# Patient Record
Sex: FEMALE | Race: WHITE | NOT HISPANIC OR LATINO | URBAN - METROPOLITAN AREA
[De-identification: names, ages, dates, MRNs, and addresses within clinical notes are randomized per-mention and may not be internally consistent; named-entity substitution may affect disease eponyms.]

---

## 2017-08-16 ENCOUNTER — APPOINTMENT (RX ONLY)
Dept: URBAN - METROPOLITAN AREA CLINIC 17 | Facility: CLINIC | Age: 37
Setting detail: DERMATOLOGY
End: 2017-08-16

## 2017-08-16 DIAGNOSIS — L90.0 LICHEN SCLEROSUS ET ATROPHICUS: ICD-10-CM

## 2017-08-16 PROCEDURE — 99202 OFFICE O/P NEW SF 15 MIN: CPT

## 2017-08-16 PROCEDURE — ? TREATMENT REGIMEN

## 2017-08-16 PROCEDURE — ? PRESCRIPTION

## 2017-08-16 PROCEDURE — ? COUNSELING

## 2017-08-16 PROCEDURE — ? OTHER

## 2017-08-16 RX ORDER — TACROLIMUS 1 MG/G
1 OINTMENT TOPICAL BID
Qty: 1 | Refills: 3 | Status: ERX | COMMUNITY
Start: 2017-08-16

## 2017-08-16 RX ADMIN — TACROLIMUS 1: 1 OINTMENT TOPICAL at 00:00

## 2017-08-16 ASSESSMENT — LOCATION ZONE DERM: LOCATION ZONE: VULVA

## 2017-08-16 ASSESSMENT — LOCATION DETAILED DESCRIPTION DERM: LOCATION DETAILED: RIGHT LABIA MINORA

## 2017-08-16 ASSESSMENT — LOCATION SIMPLE DESCRIPTION DERM: LOCATION SIMPLE: VULVA

## 2017-08-16 NOTE — PROCEDURE: TREATMENT REGIMEN
Detail Level: Generalized
Plan: Okay with her continuing the Premarin cream to treat any concurrent atrophic vaginitis
Continue Regimen: Clobetasol ointment (alternate with Protopic)
Initiate Treatment: Protopic 0.1% topical ointment BID (alternate with Clobetasol)

## 2017-08-16 NOTE — PROCEDURE: OTHER
Note Text (......Xxx Chief Complaint.): This diagnosis correlates with the
Other (Free Text): Discussed using medication daily for three months then slowly decreasing down to 2 days a week as tolerated then off completely if there are no flares for two months
Detail Level: Zone

## 2017-08-16 NOTE — HPI: RASH
Additional History: Patient has had rash on gentian area for the last 6-8 months. It started with strep infection and has been present ever since. Patient went to see PCP in [place] and was diagnosed with atrophic vaginitis and was prescribed Premarin cream to apply to area twice daily and internally. Moved to [facility] and saw an OB/GYN and was diagnosed with lichen sclerosis and was prescribed Clobetasol ointment for one month and took one week off. Patient stopped Clobetasol ointment due to palpations. Patient has been applying it once daily. When its severe she applies it twice daily. OB advised patient to stop applying Premarin internally. 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  Patient is here today for further evaluation.

## 2017-11-15 ENCOUNTER — APPOINTMENT (RX ONLY)
Dept: URBAN - METROPOLITAN AREA CLINIC 17 | Facility: CLINIC | Age: 37
Setting detail: DERMATOLOGY
End: 2017-11-15

## 2017-11-15 DIAGNOSIS — L90.0 LICHEN SCLEROSUS ET ATROPHICUS: ICD-10-CM | Status: STABLE

## 2017-11-15 PROCEDURE — 99213 OFFICE O/P EST LOW 20 MIN: CPT

## 2017-11-15 PROCEDURE — ? COUNSELING

## 2017-11-15 PROCEDURE — ? TREATMENT REGIMEN

## 2017-11-15 ASSESSMENT — LOCATION DETAILED DESCRIPTION DERM: LOCATION DETAILED: RIGHT LABIA MINORA

## 2017-11-15 ASSESSMENT — LOCATION SIMPLE DESCRIPTION DERM: LOCATION SIMPLE: VULVA

## 2017-11-15 ASSESSMENT — LOCATION ZONE DERM: LOCATION ZONE: VULVA

## 2017-11-15 NOTE — PROCEDURE: TREATMENT REGIMEN
Plan: Discussed Protopic and the studies behind the black box warning. Patient may restart it if needed.\\nDiscussed restarting the Premarin cream since she has a history of atrophied vaginitis.\\nDiscussed emollients including Vaseline and Lisa Micheal True Lipids.
Continue Regimen: Clobetasol twice daily every day in small amounts as needed or until symptoms are controlled for one month. Then begin a slow taper down to two days a week as tolerated.
Detail Level: Zone

## 2018-01-03 ENCOUNTER — APPOINTMENT (RX ONLY)
Dept: URBAN - METROPOLITAN AREA CLINIC 17 | Facility: CLINIC | Age: 38
Setting detail: DERMATOLOGY
End: 2018-01-03

## 2018-01-03 DIAGNOSIS — L90.0 LICHEN SCLEROSUS ET ATROPHICUS: ICD-10-CM | Status: INADEQUATELY CONTROLLED

## 2018-01-03 DIAGNOSIS — L73.9 FOLLICULAR DISORDER, UNSPECIFIED: ICD-10-CM

## 2018-01-03 DIAGNOSIS — L738 OTHER SPECIFIED DISEASES OF HAIR AND HAIR FOLLICLES: ICD-10-CM

## 2018-01-03 DIAGNOSIS — L663 OTHER SPECIFIED DISEASES OF HAIR AND HAIR FOLLICLES: ICD-10-CM

## 2018-01-03 PROBLEM — L02.92 FURUNCLE, UNSPECIFIED: Status: ACTIVE | Noted: 2018-01-03

## 2018-01-03 PROCEDURE — 96372 THER/PROPH/DIAG INJ SC/IM: CPT

## 2018-01-03 PROCEDURE — ? OTHER

## 2018-01-03 PROCEDURE — ? INTRAMUSCULAR KENALOG

## 2018-01-03 PROCEDURE — 99213 OFFICE O/P EST LOW 20 MIN: CPT | Mod: 25

## 2018-01-03 PROCEDURE — ? TREATMENT REGIMEN

## 2018-01-03 PROCEDURE — ? COUNSELING

## 2018-01-03 ASSESSMENT — LOCATION DETAILED DESCRIPTION DERM
LOCATION DETAILED: LEFT LABIA MINORA
LOCATION DETAILED: CLITORIS
LOCATION DETAILED: RIGHT LABIA MINORA
LOCATION DETAILED: RIGHT LABIA MAJORA
LOCATION DETAILED: RIGHT LABIA MINORA

## 2018-01-03 ASSESSMENT — LOCATION SIMPLE DESCRIPTION DERM
LOCATION SIMPLE: VULVA
LOCATION SIMPLE: VULVA

## 2018-01-03 ASSESSMENT — LOCATION ZONE DERM
LOCATION ZONE: VULVA
LOCATION ZONE: VULVA

## 2018-01-03 NOTE — PROCEDURE: INTRAMUSCULAR KENALOG
Lot # (Optional): [id number]
Expiration Date (Optional): 3/2019
Concentration (Mg/Ml): 10.0
Administered By (Optional): DR. Ria Sears M.D.
Detail Level: None
Total Volume (Ccs): 0.5
Consent: The risks of atrophy were reviewed with the patient.
Kenalog Preparation: kenalog

## 2018-01-03 NOTE — PROCEDURE: TREATMENT REGIMEN
Initiate Treatment: Patient may use Premarin cream daily at lunch, Clobetasol twice a day
Detail Level: Zone
Plan: Discussed with patient to keep a journal of sign and symptoms while monitoring treatment of Premarin and Clobetasol. Plan to rate the severity to monitor for improvement in an average week or month. Discussed different white cotton underwear. Okay to use Lisa ac lipids PRN
Discontinue Regimen: Patient will stop the pro topic 1%

## 2018-02-07 ENCOUNTER — APPOINTMENT (RX ONLY)
Dept: URBAN - METROPOLITAN AREA CLINIC 17 | Facility: CLINIC | Age: 38
Setting detail: DERMATOLOGY
End: 2018-02-07

## 2018-02-07 DIAGNOSIS — L90.0 LICHEN SCLEROSUS ET ATROPHICUS: ICD-10-CM | Status: IMPROVED

## 2018-02-07 PROCEDURE — ? OTHER

## 2018-02-07 PROCEDURE — ? PRESCRIPTION

## 2018-02-07 PROCEDURE — ? INTRAMUSCULAR KENALOG

## 2018-02-07 PROCEDURE — ? TREATMENT REGIMEN

## 2018-02-07 PROCEDURE — 96372 THER/PROPH/DIAG INJ SC/IM: CPT

## 2018-02-07 PROCEDURE — ? COUNSELING

## 2018-02-07 RX ORDER — CLOBETASOL PROPIONATE 0.5 MG/G
OINTMENT TOPICAL BID
Qty: 3 | Refills: 3 | Status: ERX | COMMUNITY
Start: 2018-02-07

## 2018-02-07 RX ADMIN — CLOBETASOL PROPIONATE: 0.5 OINTMENT TOPICAL at 00:00

## 2018-02-07 ASSESSMENT — LOCATION SIMPLE DESCRIPTION DERM: LOCATION SIMPLE: VULVA

## 2018-02-07 ASSESSMENT — LOCATION DETAILED DESCRIPTION DERM
LOCATION DETAILED: PREPUCE
LOCATION DETAILED: LEFT LABIA MAJORA
LOCATION DETAILED: RIGHT LABIA MAJORA
LOCATION DETAILED: CLITORIS
LOCATION DETAILED: LEFT LABIA MINORA
LOCATION DETAILED: RIGHT LABIA MINORA

## 2018-02-07 ASSESSMENT — LOCATION ZONE DERM: LOCATION ZONE: VULVA

## 2018-02-07 NOTE — PROCEDURE: OTHER
Detail Level: Simple
Other (Free Text): Plan to request the slide from her OB for a second opinion on the pathology.
Note Text (......Xxx Chief Complaint.): This diagnosis correlates with the

## 2018-02-07 NOTE — PROCEDURE: INTRAMUSCULAR KENALOG
Administered By (Optional): Ria Sears MD
Detail Level: None
Expiration Date (Optional): 3/2019
Total Volume (Ccs): 0.5
Kenalog Preparation: kenalog
Concentration (Mg/Ml): 5.0
Lot # (Optional): [id number]
Consent: The risks of atrophy were reviewed with the patient.
Treatment Number (Optional): 2

## 2018-02-07 NOTE — PROCEDURE: TREATMENT REGIMEN
Continue Regimen: Clobetasol twice a daily \\nPremarin cream
Detail Level: Zone
Plan: Patient uses Thanks Underwear during menstruation and it helps more than using pads

## 2018-03-21 ENCOUNTER — APPOINTMENT (RX ONLY)
Dept: URBAN - METROPOLITAN AREA CLINIC 17 | Facility: CLINIC | Age: 38
Setting detail: DERMATOLOGY
End: 2018-03-21

## 2018-03-21 DIAGNOSIS — L90.0 LICHEN SCLEROSUS ET ATROPHICUS: ICD-10-CM | Status: IMPROVED

## 2018-03-21 PROCEDURE — ? TREATMENT REGIMEN

## 2018-03-21 PROCEDURE — 99212 OFFICE O/P EST SF 10 MIN: CPT

## 2018-03-21 PROCEDURE — ? COUNSELING

## 2018-03-21 ASSESSMENT — LOCATION DETAILED DESCRIPTION DERM: LOCATION DETAILED: RIGHT LABIA MAJORA

## 2018-03-21 ASSESSMENT — LOCATION ZONE DERM: LOCATION ZONE: VULVA

## 2018-03-21 ASSESSMENT — LOCATION SIMPLE DESCRIPTION DERM: LOCATION SIMPLE: VULVA

## 2018-03-21 NOTE — PROCEDURE: TREATMENT REGIMEN
Detail Level: Detailed
Continue Regimen: Continue clobetasol ointment, begin slowly weaning as tolerated
Plan: Defer ILK today since it is well controlled

## 2018-05-30 ENCOUNTER — APPOINTMENT (RX ONLY)
Dept: URBAN - METROPOLITAN AREA CLINIC 17 | Facility: CLINIC | Age: 38
Setting detail: DERMATOLOGY
End: 2018-05-30

## 2018-05-30 DIAGNOSIS — L90.0 LICHEN SCLEROSUS ET ATROPHICUS: ICD-10-CM | Status: WELL CONTROLLED

## 2018-05-30 DIAGNOSIS — D485 NEOPLASM OF UNCERTAIN BEHAVIOR OF SKIN: ICD-10-CM

## 2018-05-30 PROBLEM — D39.8 NEOPLASM OF UNCERTAIN BEHAVIOR OF OTHER SPECIFIED FEMALE GENITAL ORGANS: Status: ACTIVE | Noted: 2018-05-30

## 2018-05-30 PROCEDURE — ? OTHER

## 2018-05-30 PROCEDURE — 99213 OFFICE O/P EST LOW 20 MIN: CPT

## 2018-05-30 PROCEDURE — ? COUNSELING

## 2018-05-30 PROCEDURE — ? TREATMENT REGIMEN

## 2018-05-30 ASSESSMENT — LOCATION DETAILED DESCRIPTION DERM: LOCATION DETAILED: LEFT LABIA MINORA

## 2018-05-30 ASSESSMENT — LOCATION SIMPLE DESCRIPTION DERM: LOCATION SIMPLE: VULVA

## 2018-05-30 ASSESSMENT — LOCATION ZONE DERM: LOCATION ZONE: VULVA

## 2018-05-30 NOTE — PROCEDURE: OTHER
Detail Level: Detailed
Other (Free Text): Recheck at next visit. It’s been present for approximately two weeks.
Note Text (......Xxx Chief Complaint.): This diagnosis correlates with the

## 2024-01-01 NOTE — PROCEDURE: OTHER
Note Text (......Xxx Chief Complaint.): This diagnosis correlates with the
Other (Free Text): Discussed that BPO and hibiclens could potentially be harsh on the nearby LS and to watch for this.
Detail Level: Detailed
Other (Free Text): Discussed starting with serial ILK prior to exploring systemic options.
never true